# Patient Record
Sex: FEMALE | Race: WHITE | Employment: FULL TIME | URBAN - METROPOLITAN AREA
[De-identification: names, ages, dates, MRNs, and addresses within clinical notes are randomized per-mention and may not be internally consistent; named-entity substitution may affect disease eponyms.]

---

## 2018-08-04 ENCOUNTER — HOSPITAL ENCOUNTER (OUTPATIENT)
Age: 38
Discharge: HOME | End: 2018-08-04
Payer: COMMERCIAL

## 2018-08-04 VITALS
RESPIRATION RATE: 16 BRPM | DIASTOLIC BLOOD PRESSURE: 88 MMHG | SYSTOLIC BLOOD PRESSURE: 120 MMHG | HEART RATE: 85 BPM | OXYGEN SATURATION: 98 % | TEMPERATURE: 99 F
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
## 2018-08-04 PROCEDURE — 99202 OFFICE O/P NEW SF 15 MIN: CPT

## 2018-08-04 RX ORDER — MONTELUKAST SODIUM 10 MG/1
10 TABLET ORAL NIGHTLY
COMMUNITY

## 2018-08-04 NOTE — ED PROVIDER NOTES
Patient Seen [place] [id number] [facility] [place]    History   Patient presents with:  Rash    Stated Complaint: RED LUMPS ALL OVER BODY/ITCHING/PAIN     27-year-old female who presents to the immediate care with complaints of a rash to her upper and lowe air)    Current:/88   Pulse 85   Temp 99 °F (37.2 °C) (Temporal)   Resp 16   LMP 07/21/2018   SpO2 98%         Physical Exam   Constitutional: She is oriented to person, place, and time. She appears well-developed and well-nourished. No distress.    H time.            Disposition and Plan     Clinical Impression:  Insect bite, initial encounter  (primary encounter diagnosis)    Disposition:  Discharge  8/4/2018  2:04 pm    Follow-up:  No follow-up provider specified.       Medications Prescribed:  Carol Chaparro